# Patient Record
Sex: MALE | Race: BLACK OR AFRICAN AMERICAN
[De-identification: names, ages, dates, MRNs, and addresses within clinical notes are randomized per-mention and may not be internally consistent; named-entity substitution may affect disease eponyms.]

---

## 2021-05-23 ENCOUNTER — HOSPITAL ENCOUNTER (EMERGENCY)
Dept: HOSPITAL 61 - ER | Age: 21
Discharge: HOME | End: 2021-05-23
Payer: COMMERCIAL

## 2021-05-23 VITALS — WEIGHT: 165.35 LBS | BODY MASS INDEX: 23.15 KG/M2 | HEIGHT: 71 IN

## 2021-05-23 VITALS — SYSTOLIC BLOOD PRESSURE: 132 MMHG | DIASTOLIC BLOOD PRESSURE: 61 MMHG

## 2021-05-23 DIAGNOSIS — Z87.891: ICD-10-CM

## 2021-05-23 DIAGNOSIS — K04.7: Primary | ICD-10-CM

## 2021-05-23 DIAGNOSIS — J45.909: ICD-10-CM

## 2021-05-23 PROCEDURE — 99283 EMERGENCY DEPT VISIT LOW MDM: CPT

## 2021-05-23 NOTE — ED.ADGEN
Past Medical History


Past Medical History:  Asthma


Past Surgical History:  No Surgical History


Smoking Status:  Former Smoker


Additional Information:  


VAPE PEN


Alcohol Use:  Occasionally





General Adult


EDM:


Chief Complaint:  DENTAL PROBLEM





HPI:


HPI:


Patient is 20-year-old male who presents to the emergency room complaining of 

left molar pain.  He states that his wisdom teeth have been coming in over the 

last couple months and he has noticed pain in this tooth recently.  It is 

progressively gotten worse.  He tried to take a couple of his mom's penicillins 

at home and this did help with his pain.  He has not seen a dentist.  He has 

been taking ibuprofen for the pain and it does help.  He denies any pain with 

swallowing.  He denies any swelling.





Review of Systems:


Review of Systems:


Complete ROS is negative unless otherwise documented in HPI





Allergies:


Allergies:





Allergies








Coded Allergies Type Severity Reaction Last Updated Verified


 


  No Known Drug Allergies    5/23/21 No











Physical Exam:


PE:


General: Awake, alert, NAD. Well Nourished, well hydrated. Cooperative


HEENT: Atraumatic, EOMI, PERRL, airway patent, moist oral mucosa, minimal 

erythema along the left wisdom tooth


Neck: Supple, trachea midline


Respiratory: CTA bilaterally, normal effort, no wheezing/crackles


CV: RRR, no murmur, cap refill <2


GI: Soft, nondistended, nontender, no masses


MSK: No obvious deformities


Skin: Warm, dry, intact


Neuro: A&O x3, speech NL, sensory and motor grossly intact, no focal deficits


Psych: Normal affect, normal mood, not suicidal or homicidal





Current Patient Data:


Vital Signs:





                                   Vital Signs








  Date Time  Temp Pulse Resp B/P (MAP) Pulse Ox O2 Delivery O2 Flow Rate FiO2


 


5/23/21 08:50 98.3 89 16 132/61 (84) 98 Room Air  





 98.3       











EKG:


EKG:


[]





Heart Score:


C/O Chest Pain:  N/A


Risk Factors:


Risk Factors:  DM, Current or recent (<one month) smoker, HTN, HLP, family 

history of CAD, obesity.


Risk Scores:


Score 0 - 3:  2.5% MACE over next 6 weeks - Discharge Home


Score 4 - 6:  20.3% MACE over next 6 weeks - Admit for Clinical Observation


Score 7 - 10:  72.7% MACE over next 6 weeks - Early Invasive Strategies





Radiology/Procedures:


Radiology/Procedures:


[]





Course & Med Decision Making:


Course & Med Decision Making


Pertinent Labs and Imaging studies reviewed. (See chart for details)





Patient is a 20-year-old male who presents to the emergency room with left tooth

 pain.  We will give him viscous lidocaine and place him on penicillin.  I have 

given him dental clinic instructions.  Patient's test results and vitals while 

in the ED were fully reviewed and discussed with the patient. Patient is stable 

and at this time does not need admission to the hospital. We have discussed 

strict return precautions and the importance of following up with their Primary 

Care Physician. Patient stated understanding and was given an opportunity to ask

 any questions. Patient is in agreement with plan.





Dragon Disclaimer:


Dragon Disclaimer:


This electronic medical record was generated, in whole or in part, using a voice

 recognition dictation system.





Departure


Departure


Impression:  


   Primary Impression:  


   Dental infection


Disposition:  01 HOME / SELF CARE / HOMELESS


Condition:  STABLE


Patient Instructions:  Dental Pain


Scripts


Lidocaine (TOPICAINE 5) 113 Gm Gel..gram.


113 GM TP Q4HRS PRN for PAIN for 5 Days, #30 EACH


   Prov: MAGUI ARVIZU MD         5/23/21 


Penicillin V Potassium (PENICILLIN V POTASSIUM) 500 Mg Tablet


2 TAB PO Q12HR, #40 TAB


   Prov: MAGUI ARVIZU MD         5/23/21











MAGUI ARVIZU MD            May 23, 2021 09:38